# Patient Record
Sex: MALE | HISPANIC OR LATINO | Employment: UNEMPLOYED | URBAN - NONMETROPOLITAN AREA
[De-identification: names, ages, dates, MRNs, and addresses within clinical notes are randomized per-mention and may not be internally consistent; named-entity substitution may affect disease eponyms.]

---

## 2022-06-02 ENCOUNTER — APPOINTMENT (OUTPATIENT)
Dept: GENERAL RADIOLOGY | Facility: HOSPITAL | Age: 32
End: 2022-06-02

## 2022-06-02 ENCOUNTER — HOSPITAL ENCOUNTER (EMERGENCY)
Facility: HOSPITAL | Age: 32
Discharge: HOME | End: 2022-06-02
Attending: EMERGENCY MEDICINE | Admitting: EMERGENCY MEDICINE

## 2022-06-02 VITALS
RESPIRATION RATE: 18 BRPM | WEIGHT: 160 LBS | DIASTOLIC BLOOD PRESSURE: 83 MMHG | HEIGHT: 66 IN | BODY MASS INDEX: 25.71 KG/M2 | SYSTOLIC BLOOD PRESSURE: 149 MMHG | OXYGEN SATURATION: 98 % | TEMPERATURE: 98.8 F | HEART RATE: 64 BPM

## 2022-06-02 DIAGNOSIS — S61.012A THUMB LACERATION, LEFT, INITIAL ENCOUNTER: ICD-10-CM

## 2022-06-02 DIAGNOSIS — S62.515B: Primary | ICD-10-CM

## 2022-06-02 PROCEDURE — 25010000002 TETANUS-DIPHTHERIA TOXOIDS (ADULT) PER 0.5 ML: Performed by: NURSE PRACTITIONER

## 2022-06-02 PROCEDURE — 73130 X-RAY EXAM OF HAND: CPT

## 2022-06-02 PROCEDURE — 90471 IMMUNIZATION ADMIN: CPT

## 2022-06-02 PROCEDURE — 25010000002 ONDANSETRON PER 1 MG: Performed by: NURSE PRACTITIONER

## 2022-06-02 PROCEDURE — 99283 EMERGENCY DEPT VISIT LOW MDM: CPT

## 2022-06-02 PROCEDURE — 96374 THER/PROPH/DIAG INJ IV PUSH: CPT

## 2022-06-02 PROCEDURE — 90714 TD VACC NO PRESV 7 YRS+ IM: CPT | Performed by: NURSE PRACTITIONER

## 2022-06-02 PROCEDURE — 96375 TX/PRO/DX INJ NEW DRUG ADDON: CPT

## 2022-06-02 PROCEDURE — 25010000002 HYDROMORPHONE 1 MG/ML SOLUTION: Performed by: NURSE PRACTITIONER

## 2022-06-02 PROCEDURE — 99284 EMERGENCY DEPT VISIT MOD MDM: CPT

## 2022-06-02 RX ORDER — SODIUM CHLORIDE 0.9 % (FLUSH) 0.9 %
10 SYRINGE (ML) INJECTION AS NEEDED
Status: DISCONTINUED | OUTPATIENT
Start: 2022-06-02 | End: 2022-06-02 | Stop reason: HOSPADM

## 2022-06-02 RX ORDER — ONDANSETRON 2 MG/ML
4 INJECTION INTRAMUSCULAR; INTRAVENOUS ONCE
Status: COMPLETED | OUTPATIENT
Start: 2022-06-02 | End: 2022-06-02

## 2022-06-02 RX ORDER — CEPHALEXIN 500 MG/1
500 CAPSULE ORAL ONCE
Status: COMPLETED | OUTPATIENT
Start: 2022-06-02 | End: 2022-06-02

## 2022-06-02 RX ORDER — LIDOCAINE HYDROCHLORIDE 10 MG/ML
10 INJECTION, SOLUTION EPIDURAL; INFILTRATION; INTRACAUDAL; PERINEURAL ONCE
Status: COMPLETED | OUTPATIENT
Start: 2022-06-02 | End: 2022-06-02

## 2022-06-02 RX ORDER — HYDROCODONE BITARTRATE AND ACETAMINOPHEN 7.5; 325 MG/1; MG/1
1 TABLET ORAL ONCE
Status: COMPLETED | OUTPATIENT
Start: 2022-06-02 | End: 2022-06-02

## 2022-06-02 RX ORDER — BUPIVACAINE HYDROCHLORIDE 5 MG/ML
10 INJECTION, SOLUTION PERINEURAL ONCE
Status: COMPLETED | OUTPATIENT
Start: 2022-06-02 | End: 2022-06-02

## 2022-06-02 RX ORDER — CEPHALEXIN 500 MG/1
500 CAPSULE ORAL 4 TIMES DAILY
Qty: 40 CAPSULE | Refills: 0 | Status: SHIPPED | OUTPATIENT
Start: 2022-06-02 | End: 2022-06-12

## 2022-06-02 RX ORDER — HYDROCODONE BITARTRATE AND ACETAMINOPHEN 7.5; 325 MG/1; MG/1
1 TABLET ORAL EVERY 6 HOURS PRN
Qty: 12 TABLET | Refills: 0 | Status: SHIPPED | OUTPATIENT
Start: 2022-06-02

## 2022-06-02 RX ADMIN — CLOSTRIDIUM TETANI TOXOID ANTIGEN (FORMALDEHYDE INACTIVATED) AND CORYNEBACTERIUM DIPHTHERIAE TOXOID ANTIGEN (FORMALDEHYDE INACTIVATED) 0.5 ML: 5; 2 INJECTION, SUSPENSION INTRAMUSCULAR at 18:35

## 2022-06-02 RX ADMIN — LIDOCAINE HYDROCHLORIDE 10 ML: 10 INJECTION, SOLUTION EPIDURAL; INFILTRATION; INTRACAUDAL; PERINEURAL at 19:51

## 2022-06-02 RX ADMIN — ONDANSETRON 4 MG: 2 INJECTION INTRAMUSCULAR; INTRAVENOUS at 18:31

## 2022-06-02 RX ADMIN — HYDROMORPHONE HYDROCHLORIDE 0.5 MG: 1 INJECTION, SOLUTION INTRAMUSCULAR; INTRAVENOUS; SUBCUTANEOUS at 18:32

## 2022-06-02 RX ADMIN — CEPHALEXIN 500 MG: 500 CAPSULE ORAL at 21:40

## 2022-06-02 RX ADMIN — HYDROCODONE BITARTRATE AND ACETAMINOPHEN 1 TABLET: 7.5; 325 TABLET ORAL at 21:47

## 2022-06-02 RX ADMIN — BUPIVACAINE HYDROCHLORIDE 10 ML: 5 INJECTION, SOLUTION PERINEURAL at 19:51

## 2022-06-02 NOTE — ED NOTES
Pt got left thumb cut with a saw at work. Bleeding is controlled at this time. Wet guaze applied and wrapped in Kerlix.

## 2022-06-03 NOTE — DISCHARGE INSTRUCTIONS
"Keep the area clean and dry. Wear your thumb splint. I spoke with Dr. Bello with [facility]. They will see you in the office (call for appointment) or you may go to their walk in clinic tomorrow between 8:30-10:30 AM for reevaluation. If you wish to see orthopedics closer to home, call tomorrow for appointment. Your X-ray reading today showed: \" Comminuted fracture/osteotomy involving the proximal  phalanx of the thumb with overlying wound\". Fill your prescriptions and take as directed. Do no drive or operate machinery while on the pain medications. Return back to the ER if you develop any complications.   "

## 2022-06-03 NOTE — ED NOTES
Pts thumb wrapped with nonstick dressing and splint applied. Pt has capillary refill less than 3 seconds in all fingers and is able to move fingers with ease.

## 2022-06-03 NOTE — ED PROVIDER NOTES
"Subjective   Patient states he was cutting wood on a saw when he cut his left thumb at the base. Pain is 10/10. Unknown when last tetanus vaccine was. Patient with flexion and extension.           Review of Systems   Skin: Positive for wound.       History reviewed. No pertinent past medical history.    No Known Allergies    History reviewed. No pertinent surgical history.    History reviewed. No pertinent family history.    Social History     Socioeconomic History   • Marital status: Single           Objective    /83   Pulse 64   Temp 98.8 °F (37.1 °C) (Oral)   Resp 18   Ht 167.6 cm (66\")   Wt 72.6 kg (160 lb)   SpO2 98%   BMI 25.82 kg/m²     Physical Exam  Constitutional:       Appearance: He is not ill-appearing.   Cardiovascular:      Rate and Rhythm: Normal rate.      Pulses: Normal pulses.   Pulmonary:      Effort: Pulmonary effort is normal.   Skin:     Comments: Laceration noted 3/4 circumfrance of base of thumb. Patient with    Neurological:      Mental Status: He is alert.         Laceration Repair    Date/Time: 6/2/2022 9:36 PM  Performed by: Macey Neff MD  Authorized by: Jignesh Wilburn MD     Consent:     Consent obtained:  Verbal    Consent given by:  Patient    Risks, benefits, and alternatives were discussed: yes      Risks discussed:  Infection, need for additional repair, pain, retained foreign body, poor cosmetic result, tendon damage, nerve damage, poor wound healing and vascular damage    Alternatives discussed:  No treatment, delayed treatment, observation and referral  Universal protocol:     Procedure explained and questions answered to patient or proxy's satisfaction: yes      Test results available: yes      Imaging studies available: yes      Required blood products, implants, devices, and special equipment available: no      Patient identity confirmed:  Verbally with patient and arm band  Anesthesia:     Anesthesia method:  Nerve block    Block anesthetic:  Lidocaine " 1% w/o epi and bupivacaine 0.5% w/o epi    Block injection procedure:  Introduced needle, incremental injection, anatomic landmarks palpated, negative aspiration for blood and anatomic landmarks identified    Block outcome:  Anesthesia achieved  Laceration details:     Location:  Finger    Finger location:  L thumb    Length (cm):  4  Pre-procedure details:     Preparation:  Patient was prepped and draped in usual sterile fashion and imaging obtained to evaluate for foreign bodies  Exploration:     Hemostasis achieved with:  Direct pressure    Imaging obtained: x-ray      Imaging outcome: foreign body not noted      Wound exploration: wound explored through full range of motion and entire depth of wound visualized      Wound extent: underlying fracture      Wound extent: no foreign bodies/material noted, no nerve damage noted and no tendon damage noted      Contaminated: yes    Treatment:     Area cleansed with:  Povidone-iodine and saline    Amount of cleaning:  Extensive    Irrigation solution:  Sterile saline    Irrigation volume:  50    Irrigation method:  Syringe    Visualized foreign bodies/material removed: no      Debridement:  None  Skin repair:     Repair method:  Sutures    Suture size:  4-0    Suture technique:  Simple interrupted    Number of sutures:  12  Approximation:     Approximation:  Close  Repair type:     Repair type:  Simple  Post-procedure details:     Dressing:  Splint for protection and non-adherent dressing    Procedure completion:  Tolerated well, no immediate complications               ED Course  ED Course as of 06/03/22 1108   Thu Jun 02, 2022 1942 Digital block performed with good results. Dr. King at bedside to suture. Patient with good flexion and extension of thumb. Wound explored with no obvious tendon/ligament involvement. Dr. Luis Fernando denson.  [SH]   2012 Spoke with Dr. Gould who states he does not do hands. He advises speaking with a facility that has a hand specialist.  [facility] called. States they have a hand specialist on call. Nurse will call me back.  []   2040 Spoke with [facility]. Will have doctor call me back.  []   2123 Spoke with Dr. Bello with [facility] who states patient may follow up in the office by calling for an appointment or follow up with the walk-in clinic tomorrow between 8:30-10:30 AM. Advised to put on antibiotics.  [SH]   2130 Unable to check Maciej due to no known social security number.  []      ED Course User Index  [] Darleen Shah, DEB                                                 [facility]    Final diagnoses:   Nondisplaced fracture of proximal phalanx of left thumb, initial encounter for open fracture   Thumb laceration, left, initial encounter       ED Disposition  ED Disposition     ED Disposition   Discharge    Condition   Stable    Comment   --             No follow-up provider specified.       Medication List      New Prescriptions    cephalexin 500 MG capsule  Commonly known as: KEFLEX  Take 1 capsule by mouth 4 (Four) Times a Day for 10 days.     HYDROcodone-acetaminophen 7.5-325 MG per tablet  Commonly known as: NORCO  Take 1 tablet by mouth Every 6 (Six) Hours As Needed for Moderate Pain .           Where to Get Your Medications      These medications were sent to [facility] [address] [place] [id number] [facility] - [phone] [facility] [phone]   [id number] [place], [place] [id number]    Phone: [phone]   · cephalexin 500 MG capsule  · HYDROcodone-acetaminophen 7.5-325 MG per tablet          Darleen Shah APRN  06/03/22 5191